# Patient Record
Sex: FEMALE | Race: BLACK OR AFRICAN AMERICAN
[De-identification: names, ages, dates, MRNs, and addresses within clinical notes are randomized per-mention and may not be internally consistent; named-entity substitution may affect disease eponyms.]

---

## 2018-05-09 ENCOUNTER — RESULT REVIEW (OUTPATIENT)
Age: 23
End: 2018-05-09

## 2019-03-08 ENCOUNTER — RESULT REVIEW (OUTPATIENT)
Age: 24
End: 2019-03-08

## 2019-08-11 ENCOUNTER — EMERGENCY (EMERGENCY)
Facility: HOSPITAL | Age: 24
LOS: 0 days | Discharge: HOME | End: 2019-08-12
Attending: EMERGENCY MEDICINE
Payer: OTHER MISCELLANEOUS

## 2019-08-11 VITALS
TEMPERATURE: 98 F | SYSTOLIC BLOOD PRESSURE: 109 MMHG | OXYGEN SATURATION: 100 % | RESPIRATION RATE: 18 BRPM | WEIGHT: 130.07 LBS | HEART RATE: 90 BPM | DIASTOLIC BLOOD PRESSURE: 68 MMHG | HEIGHT: 63 IN

## 2019-08-11 DIAGNOSIS — M79.672 PAIN IN LEFT FOOT: ICD-10-CM

## 2019-08-11 PROCEDURE — 99053 MED SERV 10PM-8AM 24 HR FAC: CPT

## 2019-08-11 PROCEDURE — 73630 X-RAY EXAM OF FOOT: CPT | Mod: LT

## 2019-08-11 PROCEDURE — 73610 X-RAY EXAM OF ANKLE: CPT | Mod: LT

## 2019-08-11 PROCEDURE — 99283 EMERGENCY DEPT VISIT LOW MDM: CPT

## 2019-08-11 PROCEDURE — 99284 EMERGENCY DEPT VISIT MOD MDM: CPT | Mod: 25

## 2019-08-12 VITALS
RESPIRATION RATE: 18 BRPM | SYSTOLIC BLOOD PRESSURE: 110 MMHG | TEMPERATURE: 98 F | OXYGEN SATURATION: 97 % | DIASTOLIC BLOOD PRESSURE: 65 MMHG | HEART RATE: 75 BPM

## 2019-08-12 PROCEDURE — 73610 X-RAY EXAM OF ANKLE: CPT | Mod: 26,LT

## 2019-08-12 PROCEDURE — 73630 X-RAY EXAM OF FOOT: CPT | Mod: 26,LT

## 2019-08-12 RX ORDER — IBUPROFEN 200 MG
600 TABLET ORAL ONCE
Refills: 0 | Status: COMPLETED | OUTPATIENT
Start: 2019-08-12 | End: 2019-08-12

## 2019-08-12 RX ORDER — IBUPROFEN 200 MG
1 TABLET ORAL
Qty: 40 | Refills: 0
Start: 2019-08-12 | End: 2019-08-21

## 2019-08-12 RX ORDER — CYCLOBENZAPRINE HYDROCHLORIDE 10 MG/1
1 TABLET, FILM COATED ORAL
Qty: 15 | Refills: 0
Start: 2019-08-12 | End: 2019-08-16

## 2019-08-12 RX ORDER — LIDOCAINE 4 G/100G
1 CREAM TOPICAL ONCE
Refills: 0 | Status: COMPLETED | OUTPATIENT
Start: 2019-08-12 | End: 2019-08-12

## 2019-08-12 RX ADMIN — LIDOCAINE 1 PATCH: 4 CREAM TOPICAL at 01:02

## 2019-08-12 RX ADMIN — Medication 600 MILLIGRAM(S): at 01:02

## 2019-08-12 NOTE — ED ADULT NURSE NOTE - NSIMPLEMENTINTERV_GEN_ALL_ED
Detail Level: Generalized
Implemented All Fall Risk Interventions:  [place] to call system. Call bell, personal items and telephone within reach. Instruct patient to call for assistance. Room bathroom lighting operational. Non-slip footwear when patient is off stretcher. Physically safe environment: no spills, clutter or unnecessary equipment. Stretcher in lowest position, wheels locked, appropriate side rails in place. Provide visual cue, wrist band, yellow gown, etc. Monitor gait and stability. Monitor for mental status changes and reorient to person, place, and time. Review medications for side effects contributing to fall risk. Reinforce activity limits and safety measures with patient and family.
Detail Level: Simple

## 2019-08-12 NOTE — ED ADULT NURSE NOTE - OBJECTIVE STATEMENT
Pt presented to ED c/o left foot and ankle pain since 20:00PM. Pt is a  at the [employer]; she picked up stuff from the floor and went up and down stairs before pain started. Denies falls, trauma. Pain worsens when foot is elevated, relieved by dangling. Came in a wheelchair from the waiting room, unable to bear weight. Ice pack applied.

## 2019-08-12 NOTE — ED PROVIDER NOTE - NSFOLLOWUPINSTRUCTIONS_ED_ALL_ED_FT
please follow up with your doctor in 2-3 days.   take medications as prescribed.    keep leg elevated.   may use ice or heating pads for comfort.   return to ED for any concerns

## 2021-05-07 PROBLEM — Z78.9 OTHER SPECIFIED HEALTH STATUS: Chronic | Status: ACTIVE | Noted: 2019-08-12

## 2021-06-16 ENCOUNTER — APPOINTMENT (OUTPATIENT)
Dept: ORTHOPEDIC SURGERY | Facility: HOSPITAL | Age: 26
End: 2021-06-16

## 2021-07-14 ENCOUNTER — OUTPATIENT (OUTPATIENT)
Dept: OUTPATIENT SERVICES | Facility: HOSPITAL | Age: 26
LOS: 1 days | End: 2021-07-14
Payer: SELF-PAY

## 2021-07-14 ENCOUNTER — APPOINTMENT (OUTPATIENT)
Dept: ORTHOPEDIC SURGERY | Facility: HOSPITAL | Age: 26
End: 2021-07-14

## 2021-07-14 VITALS
HEIGHT: 63.75 IN | SYSTOLIC BLOOD PRESSURE: 107 MMHG | BODY MASS INDEX: 22.99 KG/M2 | WEIGHT: 133 LBS | HEART RATE: 99 BPM | RESPIRATION RATE: 14 BRPM | DIASTOLIC BLOOD PRESSURE: 62 MMHG

## 2021-07-14 DIAGNOSIS — M25.50 PAIN IN UNSPECIFIED JOINT: ICD-10-CM

## 2021-07-14 DIAGNOSIS — M67.40 GANGLION, UNSPECIFIED SITE: ICD-10-CM

## 2021-07-14 PROCEDURE — G0463: CPT

## 2021-07-21 ENCOUNTER — OUTPATIENT (OUTPATIENT)
Dept: OUTPATIENT SERVICES | Facility: HOSPITAL | Age: 26
LOS: 1 days | End: 2021-07-21
Payer: SELF-PAY

## 2021-07-21 ENCOUNTER — APPOINTMENT (OUTPATIENT)
Dept: ORTHOPEDIC SURGERY | Facility: HOSPITAL | Age: 26
End: 2021-07-21

## 2021-07-21 VITALS
WEIGHT: 113 LBS | HEIGHT: 63.75 IN | DIASTOLIC BLOOD PRESSURE: 71 MMHG | HEART RATE: 89 BPM | TEMPERATURE: 97.5 F | BODY MASS INDEX: 19.53 KG/M2 | SYSTOLIC BLOOD PRESSURE: 118 MMHG | RESPIRATION RATE: 14 BRPM

## 2021-07-21 DIAGNOSIS — M67.40 GANGLION, UNSPECIFIED SITE: ICD-10-CM

## 2021-07-21 DIAGNOSIS — M79.643 PAIN IN UNSPECIFIED HAND: ICD-10-CM

## 2021-07-21 PROCEDURE — G0463: CPT

## 2022-02-22 NOTE — ED PROVIDER NOTE - TOBACCO USE
[Nl] : Respiratory [No Acute Changes] : No acute changes since previous visit Unknown if ever smoked

## 2023-09-22 ENCOUNTER — EMERGENCY (EMERGENCY)
Facility: HOSPITAL | Age: 28
LOS: 0 days | Discharge: HOME | End: 2023-09-22
Attending: STUDENT IN AN ORGANIZED HEALTH CARE EDUCATION/TRAINING PROGRAM
Payer: SELF-PAY

## 2023-09-22 VITALS
RESPIRATION RATE: 19 BRPM | WEIGHT: 130.07 LBS | TEMPERATURE: 98 F | HEART RATE: 75 BPM | SYSTOLIC BLOOD PRESSURE: 109 MMHG | DIASTOLIC BLOOD PRESSURE: 75 MMHG | HEIGHT: 62 IN | OXYGEN SATURATION: 100 %

## 2023-09-22 VITALS
HEART RATE: 63 BPM | SYSTOLIC BLOOD PRESSURE: 105 MMHG | TEMPERATURE: 98 F | OXYGEN SATURATION: 100 % | RESPIRATION RATE: 18 BRPM | DIASTOLIC BLOOD PRESSURE: 67 MMHG

## 2023-09-22 DIAGNOSIS — R07.89 OTHER CHEST PAIN: ICD-10-CM

## 2023-09-22 LAB
ALBUMIN SERPL ELPH-MCNC: 3.8 G/DL — SIGNIFICANT CHANGE UP (ref 3.3–5)
ALP SERPL-CCNC: 32 U/L — LOW (ref 40–120)
ALT FLD-CCNC: 20 U/L — SIGNIFICANT CHANGE UP (ref 12–78)
ANION GAP SERPL CALC-SCNC: 3 MMOL/L — LOW (ref 5–17)
AST SERPL-CCNC: 15 U/L — SIGNIFICANT CHANGE UP (ref 15–37)
BASOPHILS # BLD AUTO: 0.05 K/UL — SIGNIFICANT CHANGE UP (ref 0–0.2)
BASOPHILS NFR BLD AUTO: 0.8 % — SIGNIFICANT CHANGE UP (ref 0–2)
BILIRUB SERPL-MCNC: 0.6 MG/DL — SIGNIFICANT CHANGE UP (ref 0.2–1.2)
BUN SERPL-MCNC: 10 MG/DL — SIGNIFICANT CHANGE UP (ref 7–23)
CALCIUM SERPL-MCNC: 9.1 MG/DL — SIGNIFICANT CHANGE UP (ref 8.5–10.1)
CHLORIDE SERPL-SCNC: 107 MMOL/L — SIGNIFICANT CHANGE UP (ref 96–108)
CO2 SERPL-SCNC: 27 MMOL/L — SIGNIFICANT CHANGE UP (ref 22–31)
CREAT SERPL-MCNC: 0.8 MG/DL — SIGNIFICANT CHANGE UP (ref 0.5–1.3)
D DIMER BLD IA.RAPID-MCNC: 161 NG/ML DDU — SIGNIFICANT CHANGE UP
EGFR: 102 ML/MIN/1.73M2 — SIGNIFICANT CHANGE UP
EOSINOPHIL # BLD AUTO: 0.13 K/UL — SIGNIFICANT CHANGE UP (ref 0–0.5)
EOSINOPHIL NFR BLD AUTO: 2.2 % — SIGNIFICANT CHANGE UP (ref 0–6)
GLUCOSE SERPL-MCNC: 91 MG/DL — SIGNIFICANT CHANGE UP (ref 70–99)
HCG SERPL-ACNC: <1 MIU/ML — SIGNIFICANT CHANGE UP
HCT VFR BLD CALC: 38.8 % — SIGNIFICANT CHANGE UP (ref 34.5–45)
HGB BLD-MCNC: 12.5 G/DL — SIGNIFICANT CHANGE UP (ref 11.5–15.5)
IMM GRANULOCYTES NFR BLD AUTO: 0.2 % — SIGNIFICANT CHANGE UP (ref 0–0.9)
LYMPHOCYTES # BLD AUTO: 2.57 K/UL — SIGNIFICANT CHANGE UP (ref 1–3.3)
LYMPHOCYTES # BLD AUTO: 43.2 % — SIGNIFICANT CHANGE UP (ref 13–44)
MCHC RBC-ENTMCNC: 28.7 PG — SIGNIFICANT CHANGE UP (ref 27–34)
MCHC RBC-ENTMCNC: 32.2 GM/DL — SIGNIFICANT CHANGE UP (ref 32–36)
MCV RBC AUTO: 89.2 FL — SIGNIFICANT CHANGE UP (ref 80–100)
MONOCYTES # BLD AUTO: 0.29 K/UL — SIGNIFICANT CHANGE UP (ref 0–0.9)
MONOCYTES NFR BLD AUTO: 4.9 % — SIGNIFICANT CHANGE UP (ref 2–14)
NEUTROPHILS # BLD AUTO: 2.9 K/UL — SIGNIFICANT CHANGE UP (ref 1.8–7.4)
NEUTROPHILS NFR BLD AUTO: 48.7 % — SIGNIFICANT CHANGE UP (ref 43–77)
PLATELET # BLD AUTO: 213 K/UL — SIGNIFICANT CHANGE UP (ref 150–400)
POTASSIUM SERPL-MCNC: 4 MMOL/L — SIGNIFICANT CHANGE UP (ref 3.5–5.3)
POTASSIUM SERPL-SCNC: 4 MMOL/L — SIGNIFICANT CHANGE UP (ref 3.5–5.3)
PROT SERPL-MCNC: 7.8 GM/DL — SIGNIFICANT CHANGE UP (ref 6–8.3)
RBC # BLD: 4.35 M/UL — SIGNIFICANT CHANGE UP (ref 3.8–5.2)
RBC # FLD: 13.4 % — SIGNIFICANT CHANGE UP (ref 10.3–14.5)
SODIUM SERPL-SCNC: 137 MMOL/L — SIGNIFICANT CHANGE UP (ref 135–145)
TROPONIN I, HIGH SENSITIVITY RESULT: 3.12 NG/L — SIGNIFICANT CHANGE UP
WBC # BLD: 5.95 K/UL — SIGNIFICANT CHANGE UP (ref 3.8–10.5)
WBC # FLD AUTO: 5.95 K/UL — SIGNIFICANT CHANGE UP (ref 3.8–10.5)

## 2023-09-22 PROCEDURE — 84702 CHORIONIC GONADOTROPIN TEST: CPT

## 2023-09-22 PROCEDURE — 93005 ELECTROCARDIOGRAM TRACING: CPT

## 2023-09-22 PROCEDURE — 99285 EMERGENCY DEPT VISIT HI MDM: CPT | Mod: 25

## 2023-09-22 PROCEDURE — 85025 COMPLETE CBC W/AUTO DIFF WBC: CPT

## 2023-09-22 PROCEDURE — 36415 COLL VENOUS BLD VENIPUNCTURE: CPT

## 2023-09-22 PROCEDURE — 99285 EMERGENCY DEPT VISIT HI MDM: CPT

## 2023-09-22 PROCEDURE — 71046 X-RAY EXAM CHEST 2 VIEWS: CPT | Mod: 26

## 2023-09-22 PROCEDURE — 84484 ASSAY OF TROPONIN QUANT: CPT

## 2023-09-22 PROCEDURE — 93010 ELECTROCARDIOGRAM REPORT: CPT

## 2023-09-22 PROCEDURE — 71046 X-RAY EXAM CHEST 2 VIEWS: CPT

## 2023-09-22 PROCEDURE — 80053 COMPREHEN METABOLIC PANEL: CPT

## 2023-09-22 PROCEDURE — 85379 FIBRIN DEGRADATION QUANT: CPT

## 2023-09-22 NOTE — ED ADULT TRIAGE NOTE - CHIEF COMPLAINT QUOTE
Pt with c/o left sided chest discomfort with movement of left arm.  Denies pain radiating.  States she has been having the pain for last month.

## 2023-09-22 NOTE — ED STATDOCS - PROGRESS NOTE DETAILS
29 yo female with no significant PMH presents with midsternal/L sided chest pain x couple of months that worsened today. Pt states its worse with movement and movement of the L arm. Denies fever, cough, abd pain, n/v, sob, leg swelling. LMP was 1 week ago. Pt is on OC. Pt is worried because her grandmother had many heart attacks in the past.   Lungs CTA b/l. Heart NRR. EKG unremarkable.   Will check labs, CXR. Offered pain meds but pt declined at this time. -Stoney Clayton PA-C 29 yo female with no significant PMH presents with midsternal/L sided chest pain x couple of months that worsened today. Characterized as a sharp pain. Pt states its worse with movement and movement of the L arm. Denies fever, cough, abd pain, n/v, sob, leg swelling. LMP was 1 week ago. Pt is on OC. Pt is worried because her grandmother had many heart attacks in the past.   Lungs CTA b/l. Heart NRR. EKG unremarkable.   Will check labs, CXR. Offered pain meds but pt declined at this time. -Stoney Clayton PA-C CXR and labs unremarkable. DD and trop negative. Pt was made aware of the results. Will d/c home. -Stoney Clayton PA-C

## 2023-09-22 NOTE — ED STATDOCS - CLINICAL SUMMARY MEDICAL DECISION MAKING FREE TEXT BOX
pt with chest pain. no family hx of sudden death or cardiac disease at early age. vitally stable in ED. birth control use. will order labs and CXR. EKG with normal sinus, no ischemia.

## 2023-09-22 NOTE — ED STATDOCS - NS ED MD DISPO DISCHARGE
Please inform somewhat unusual to develop thrush after one course of antibiotics and would usually therefore recommend an evaluation but due to the current pandemic will agree to go ahead and treat in case indeed does have this issue.    5 mL 4 times per day for 10 days  Rx sent   Home

## 2023-09-22 NOTE — ED STATDOCS - NS ED ATTENDING STATEMENT MOD
This was a shared visit with the YOU. I reviewed and verified the documentation and independently performed the documented:

## 2023-09-22 NOTE — ED STATDOCS - PATIENT PORTAL LINK FT
You can access the FollowMyHealth Patient Portal offered by [facility] by registering at the following website: [email or web address]. By joining [employer]’s FollowMyHealth portal, you will also be able to view your health information using other applications (apps) compatible with our system.

## 2023-09-22 NOTE — ED STATDOCS - NSFOLLOWUPINSTRUCTIONS_ED_ALL_ED_FT
Nonspecific Chest Pain  Chest pain can be caused by many different conditions. Some causes of chest pain can be life-threatening. These will require treatment right away. Serious causes of chest pain include:  Heart attack.  A tear in the body's main blood vessel.  Redness and swelling (inflammation) around your heart.  Blood clot in your lungs.  Other causes of chest pain may not be so serious. These include:  Heartburn.  Anxiety or stress.  Damage to bones or muscles in your chest.  Lung infections.  Chest pain can feel like:  Pain or discomfort in your chest.  Crushing, pressure, aching, or squeezing pain.  Burning or tingling.  Dull or sharp pain that is worse when you move, cough, or take a deep breath.  Pain or discomfort that is also felt in your back, neck, jaw, shoulder, or arm, or pain that spreads to any of these areas.  It is hard to know whether your pain is caused by something that is serious or something that is not so serious. So it is important to see your doctor right away if you have chest pain.    Follow these instructions at home:  Medicines    Take over-the-counter and prescription medicines only as told by your doctor.  If you were prescribed an antibiotic medicine, take it as told by your doctor. Do not stop taking the antibiotic even if you start to feel better.  Lifestyle    A plate along with examples of foods in a healthy diet.  Rest as told by your doctor.  Do not use any products that contain nicotine or tobacco, such as cigarettes, e-cigarettes, and chewing tobacco. If you need help quitting, ask your doctor.  Do not drink alcohol.  Make lifestyle changes as told by your doctor. These may include:  Getting regular exercise. Ask your doctor what activities are safe for you.  Eating a heart-healthy diet. A diet and nutrition specialist (dietitian) can help you to learn healthy eating options.  Staying at a healthy weight.  Treating diabetes or high blood pressure, if needed.  Lowering your stress. Activities such as yoga and relaxation techniques can help.  General instructions    Pay attention to any changes in your symptoms. Tell your doctor about them or any new symptoms.  Avoid any activities that cause chest pain.  Keep all follow-up visits as told by your doctor. This is important. You may need more testing if your chest pain does not go away.  Contact a doctor if:  Your chest pain does not go away.  You feel depressed.  You have a fever.  Get help right away if:  Your chest pain is worse.  You have a cough that gets worse, or you cough up blood.  You have very bad (severe) pain in your belly (abdomen).  You pass out (faint).  You have either of these for no clear reason:  Sudden chest discomfort.  Sudden discomfort in your arms, back, neck, or jaw.  You have shortness of breath at any time.  You suddenly start to sweat, or your skin gets clammy.  You feel sick to your stomach (nauseous).  You throw up (vomit).  You suddenly feel lightheaded or dizzy.  You feel very weak or tired.  Your heart starts to beat fast, or it feels like it is skipping beats.  These symptoms may be an emergency. Do not wait to see if the symptoms will go away. Get medical help right away. Call your local emergency services (911 in the [place] Do not drive yourself to the hospital.    Summary  Chest pain can be caused by many different conditions. The cause may be serious and need treatment right away. If you have chest pain, see your doctor right away.  Follow your doctor's instructions for taking medicines and making lifestyle changes.  Keep all follow-up visits as told by your doctor. This includes visits for any further testing if your chest pain does not go away.  Be sure to know the signs that show that your condition has become worse. Get help right away if you have these symptoms.

## 2023-09-22 NOTE — ED STATDOCS - OBJECTIVE STATEMENT
29 y/o female with no pertinent PMHx presents to ED c/o non radiating midsternal sharp chest pain for the past couple of months, today more intense. States pain is worse with movement. Not associated with shortness of breath. Denies fever, coughs, nausea, or vomiting. On oral contraceptives. No recent traveling. No lower extremities swelling. No early age family hx of cardiac disease

## 2023-09-22 NOTE — ED STATDOCS - ATTENDING APP SHARED VISIT CONTRIBUTION OF CARE
I, Julius Castro MD,  performed the initial face to face bedside interview with this patient regarding history of present illness, review of symptoms and relevant past medical, social and family history.  I completed an independent physical examination.  I was the initial provider who evaluated this patient.   I personally saw the patient and performed a substantive portion of the visit including all aspects of the medical decision making.  I have signed out the follow up of any pending tests (i.e. labs, radiological studies) to the YOU.  I have communicated the patient’s plan of care and disposition with the YOU.  The history, relevant review of systems, past medical and surgical history, medical decision making, and physical examination was documented by the scribe in my presence and I attest to the accuracy of the documentation.

## 2024-08-16 ENCOUNTER — ASOB RESULT (OUTPATIENT)
Age: 29
End: 2024-08-16

## 2024-08-16 ENCOUNTER — APPOINTMENT (OUTPATIENT)
Dept: ANTEPARTUM | Facility: CLINIC | Age: 29
End: 2024-08-16
Payer: MEDICAID

## 2024-08-16 DIAGNOSIS — O35.9XX0 MATERNAL CARE FOR (SUSPECTED) FETAL ABNORMALITY AND DAMAGE, UNSPECIFIED, NOT APPLICABLE OR UNSPECIFIED: ICD-10-CM

## 2024-08-16 PROCEDURE — 76814 OB US NUCHAL MEAS ADD-ON: CPT

## 2024-08-16 PROCEDURE — 76813 OB US NUCHAL MEAS 1 GEST: CPT

## 2024-08-21 LAB
ADDITIONAL US: NORMAL
COMMENTS: AFP: NORMAL
CRL SCAN TWIN B: NORMAL
CRL SCAN: NORMAL
CROWN RUMP LENGTH TWIN B: 73.3 MM
CROWN RUMP LENGTH: 64.1 MM
DOWN SYNDROME AGE RISK: NORMAL
DOWN SYNDROME INTERPRETATION: NORMAL
DOWN SYNDROME SCREENING RISK: NORMAL
GEST. AGE ON COLLECTION DATE: 13.3 WEEKS
HCG MOM: 1.43
HCG VALUE: 117.1 IU/ML
MATERNAL AGE AT EDD: 31.1 YR
NOTE: AFP: NORMAL
NT MOM TWIN B: 0.93
NT TWIN B: 1.8 MM
NUCHAL TRANSLUCENCY (NT): 1.4 MM
NUCHAL TRANSLUCENCY MOM: 0.84
NUMBER OF FETUSES: 2
PAPP-A MOM: 4.51
PAPP-A VALUE: 6293.3 NG/ML
RACE: NORMAL
RESULTS AFP: NORMAL
SONOGRAPHER ID#: NORMAL
SUBMIT PART 2 SAMPLE USING: NORMAL
TEST RESULTS: AFP: NORMAL
TRISOMY 18 AGE RISK: NORMAL
TRISOMY 18 INTERPRETATION: NORMAL
TRISOMY 18 SCREENING RISK: NORMAL
WEIGHT AFP: 146 LBS

## 2024-09-13 ENCOUNTER — APPOINTMENT (OUTPATIENT)
Dept: ANTEPARTUM | Facility: CLINIC | Age: 29
End: 2024-09-13
Payer: MEDICAID

## 2024-09-13 ENCOUNTER — ASOB RESULT (OUTPATIENT)
Age: 29
End: 2024-09-13

## 2024-09-13 PROCEDURE — 76817 TRANSVAGINAL US OBSTETRIC: CPT

## 2024-09-13 PROCEDURE — 76815 OB US LIMITED FETUS(S): CPT

## 2024-09-13 PROCEDURE — 36415 COLL VENOUS BLD VENIPUNCTURE: CPT

## 2024-09-17 LAB
ADDITIONAL US: NORMAL
AFP MOM: 2.37
AFP VALUE: 97.5 NG/ML
COLLECTED ON 2: NORMAL
COLLECTED ON: NORMAL
CRL SCAN TWIN B: NORMAL
CRL SCAN: NORMAL
CROWN RUMP LENGTH TWIN B: 73.3 MM
CROWN RUMP LENGTH: 64.1 MM
DIA MOM: 1.91
DIA VALUE: 310.9 PG/ML
DOWN SYNDROME AGE RISK: NORMAL
DOWN SYNDROME INTERPRETATION: NORMAL
DOWN SYNDROME SCREENING RISK: NORMAL
FIRST TRIMESTER SAMPLE: NORMAL
GEST. AGE ON COLLECTION DATE: 13.3 WEEKS
GESTATIONAL AGE: 17.3 WEEKS
HCG MOM: 1.83
HCG VALUE: 52.2 IU/ML
INSULIN DEP DIABETES: NO
MATERNAL AGE AT EDD: 31.1 YR
NT MOM TWIN B: 0.93
NT TWIN B: 1.8 MM
NUCHAL TRANSLUCENCY (NT): 1.4 MM
NUCHAL TRANSLUCENCY MOM: 0.84
NUMBER OF FETUSES: 2
OPEN SPINA BIFIDA: NORMAL
OSB INTERPRETATION: NORMAL
PAPP-A MOM: 4.51
PAPP-A VALUE: 6293.3 NG/ML
RACE: NORMAL
SECOND TRIMESTER SAMPLE: NORMAL
SEQUENTIAL 2 COMMENTS: NORMAL
SEQUENTIAL 2 NOTE: NORMAL
SEQUENTIAL 2 RESULTS: NORMAL
SEQUENTIAL 2 TEST RESULTS: NORMAL
SONOGRAPHER ID#: NORMAL
TRISOMY 18 AGE RISK: NORMAL
TRISOMY 18 INTERPRETATION: NORMAL
TRISOMY 18 SCREENING RISK: NORMAL
UE3 MOM: 1.27
UE3 VALUE: 1.43 NG/ML
WEIGHT AFP: 146 LBS
WEIGHT: 147 LBS

## 2024-10-11 ENCOUNTER — APPOINTMENT (OUTPATIENT)
Dept: ANTEPARTUM | Facility: CLINIC | Age: 29
End: 2024-10-11

## 2024-10-11 ENCOUNTER — ASOB RESULT (OUTPATIENT)
Age: 29
End: 2024-10-11

## 2024-10-11 PROCEDURE — 76811 OB US DETAILED SNGL FETUS: CPT

## 2024-10-11 PROCEDURE — 76812 OB US DETAILED ADDL FETUS: CPT

## 2024-10-11 PROCEDURE — 76817 TRANSVAGINAL US OBSTETRIC: CPT | Mod: 59

## 2024-11-08 ENCOUNTER — ASOB RESULT (OUTPATIENT)
Age: 29
End: 2024-11-08

## 2024-11-08 ENCOUNTER — APPOINTMENT (OUTPATIENT)
Dept: ANTEPARTUM | Facility: CLINIC | Age: 29
End: 2024-11-08
Payer: MEDICAID

## 2024-11-08 PROCEDURE — 76816 OB US FOLLOW-UP PER FETUS: CPT | Mod: 59

## 2024-12-06 ENCOUNTER — ASOB RESULT (OUTPATIENT)
Age: 29
End: 2024-12-06

## 2024-12-06 ENCOUNTER — APPOINTMENT (OUTPATIENT)
Dept: ANTEPARTUM | Facility: CLINIC | Age: 29
End: 2024-12-06
Payer: MEDICAID

## 2024-12-06 PROCEDURE — 76820 UMBILICAL ARTERY ECHO: CPT | Mod: 59

## 2024-12-06 PROCEDURE — 76816 OB US FOLLOW-UP PER FETUS: CPT

## 2024-12-06 PROCEDURE — 76819 FETAL BIOPHYS PROFIL W/O NST: CPT | Mod: 59

## 2024-12-27 ENCOUNTER — ASOB RESULT (OUTPATIENT)
Age: 29
End: 2024-12-27

## 2024-12-27 ENCOUNTER — APPOINTMENT (OUTPATIENT)
Dept: ANTEPARTUM | Facility: CLINIC | Age: 29
End: 2024-12-27
Payer: MEDICAID

## 2024-12-27 PROCEDURE — 76820 UMBILICAL ARTERY ECHO: CPT | Mod: 59

## 2024-12-27 PROCEDURE — 76818 FETAL BIOPHYS PROFILE W/NST: CPT | Mod: 59

## 2024-12-27 PROCEDURE — 76816 OB US FOLLOW-UP PER FETUS: CPT

## 2024-12-27 PROCEDURE — 76816 OB US FOLLOW-UP PER FETUS: CPT | Mod: 59

## 2025-01-03 ENCOUNTER — APPOINTMENT (OUTPATIENT)
Dept: ANTEPARTUM | Facility: CLINIC | Age: 30
End: 2025-01-03

## 2025-01-03 ENCOUNTER — APPOINTMENT (OUTPATIENT)
Dept: ANTEPARTUM | Facility: CLINIC | Age: 30
End: 2025-01-03
Payer: MEDICAID

## 2025-01-03 ENCOUNTER — ASOB RESULT (OUTPATIENT)
Age: 30
End: 2025-01-03

## 2025-01-03 PROCEDURE — 76820 UMBILICAL ARTERY ECHO: CPT | Mod: 59

## 2025-01-03 PROCEDURE — 76821 MIDDLE CEREBRAL ARTERY ECHO: CPT | Mod: 59

## 2025-01-03 PROCEDURE — 76818 FETAL BIOPHYS PROFILE W/NST: CPT | Mod: 59

## 2025-01-10 ENCOUNTER — ASOB RESULT (OUTPATIENT)
Age: 30
End: 2025-01-10

## 2025-01-10 ENCOUNTER — APPOINTMENT (OUTPATIENT)
Dept: ANTEPARTUM | Facility: CLINIC | Age: 30
End: 2025-01-10
Payer: MEDICAID

## 2025-01-10 PROCEDURE — 76818 FETAL BIOPHYS PROFILE W/NST: CPT | Mod: 59

## 2025-01-10 PROCEDURE — 76820 UMBILICAL ARTERY ECHO: CPT

## 2025-01-10 PROCEDURE — 76821 MIDDLE CEREBRAL ARTERY ECHO: CPT

## 2025-01-17 ENCOUNTER — ASOB RESULT (OUTPATIENT)
Age: 30
End: 2025-01-17

## 2025-01-17 ENCOUNTER — APPOINTMENT (OUTPATIENT)
Dept: ANTEPARTUM | Facility: CLINIC | Age: 30
End: 2025-01-17
Payer: MEDICAID

## 2025-01-17 PROCEDURE — 76818 FETAL BIOPHYS PROFILE W/NST: CPT

## 2025-01-17 PROCEDURE — 76820 UMBILICAL ARTERY ECHO: CPT | Mod: 59

## 2025-01-17 PROCEDURE — 76816 OB US FOLLOW-UP PER FETUS: CPT | Mod: 59

## 2025-01-17 PROCEDURE — 76821 MIDDLE CEREBRAL ARTERY ECHO: CPT | Mod: 59

## 2025-01-17 PROCEDURE — 76816 OB US FOLLOW-UP PER FETUS: CPT

## 2025-01-21 ENCOUNTER — APPOINTMENT (OUTPATIENT)
Dept: ANTEPARTUM | Facility: CLINIC | Age: 30
End: 2025-01-21

## 2025-01-21 ENCOUNTER — APPOINTMENT (OUTPATIENT)
Dept: ANTEPARTUM | Facility: CLINIC | Age: 30
End: 2025-01-21
Payer: MEDICAID

## 2025-01-21 ENCOUNTER — ASOB RESULT (OUTPATIENT)
Age: 30
End: 2025-01-21

## 2025-01-21 PROCEDURE — 76820 UMBILICAL ARTERY ECHO: CPT | Mod: 59

## 2025-01-21 PROCEDURE — 76819 FETAL BIOPHYS PROFIL W/O NST: CPT

## 2025-01-21 PROCEDURE — 93976 VASCULAR STUDY: CPT

## 2025-01-24 ENCOUNTER — APPOINTMENT (OUTPATIENT)
Dept: ANTEPARTUM | Facility: CLINIC | Age: 30
End: 2025-01-24
Payer: MEDICAID

## 2025-01-24 ENCOUNTER — ASOB RESULT (OUTPATIENT)
Age: 30
End: 2025-01-24

## 2025-01-24 PROCEDURE — 76821 MIDDLE CEREBRAL ARTERY ECHO: CPT

## 2025-01-24 PROCEDURE — 76818 FETAL BIOPHYS PROFILE W/NST: CPT | Mod: 59

## 2025-01-24 PROCEDURE — 76818 FETAL BIOPHYS PROFILE W/NST: CPT

## 2025-01-24 PROCEDURE — 76820 UMBILICAL ARTERY ECHO: CPT

## 2025-01-24 PROCEDURE — 76820 UMBILICAL ARTERY ECHO: CPT | Mod: 59

## 2025-01-28 ENCOUNTER — ASOB RESULT (OUTPATIENT)
Age: 30
End: 2025-01-28

## 2025-01-28 ENCOUNTER — APPOINTMENT (OUTPATIENT)
Dept: ANTEPARTUM | Facility: CLINIC | Age: 30
End: 2025-01-28
Payer: MEDICAID

## 2025-01-28 PROCEDURE — 76820 UMBILICAL ARTERY ECHO: CPT

## 2025-01-28 PROCEDURE — 76818 FETAL BIOPHYS PROFILE W/NST: CPT | Mod: 59

## 2025-01-28 PROCEDURE — 76818 FETAL BIOPHYS PROFILE W/NST: CPT

## 2025-01-31 ENCOUNTER — APPOINTMENT (OUTPATIENT)
Dept: ANTEPARTUM | Facility: CLINIC | Age: 30
End: 2025-01-31

## 2025-01-31 ENCOUNTER — INPATIENT (INPATIENT)
Facility: HOSPITAL | Age: 30
LOS: 2 days | Discharge: HOME | DRG: 566 | End: 2025-02-03
Attending: OBSTETRICS & GYNECOLOGY | Admitting: OBSTETRICS & GYNECOLOGY
Payer: MEDICAID

## 2025-01-31 VITALS
SYSTOLIC BLOOD PRESSURE: 117 MMHG | HEIGHT: 63 IN | WEIGHT: 188.94 LBS | TEMPERATURE: 98 F | RESPIRATION RATE: 18 BRPM | HEART RATE: 82 BPM | DIASTOLIC BLOOD PRESSURE: 66 MMHG

## 2025-01-31 DIAGNOSIS — O36.5930 MATERNAL CARE FOR OTHER KNOWN OR SUSPECTED POOR FETAL GROWTH, THIRD TRIMESTER, NOT APPLICABLE OR UNSPECIFIED: ICD-10-CM

## 2025-01-31 LAB
BASOPHILS # BLD AUTO: 0 K/UL — SIGNIFICANT CHANGE UP (ref 0–0.2)
BASOPHILS NFR BLD AUTO: 0 % — SIGNIFICANT CHANGE UP (ref 0–2)
EOSINOPHIL # BLD AUTO: 0.21 K/UL — SIGNIFICANT CHANGE UP (ref 0–0.5)
EOSINOPHIL NFR BLD AUTO: 3 % — SIGNIFICANT CHANGE UP (ref 0–6)
HCT VFR BLD CALC: 28.8 % — LOW (ref 34.5–45)
HCT VFR BLD CALC: 32.9 % — LOW (ref 34.5–45)
HGB BLD-MCNC: 10.7 G/DL — LOW (ref 11.5–15.5)
HGB BLD-MCNC: 9.4 G/DL — LOW (ref 11.5–15.5)
LYMPHOCYTES # BLD AUTO: 1.55 K/UL — SIGNIFICANT CHANGE UP (ref 1–3.3)
LYMPHOCYTES # BLD AUTO: 22 % — SIGNIFICANT CHANGE UP (ref 13–44)
MCHC RBC-ENTMCNC: 30.4 PG — SIGNIFICANT CHANGE UP (ref 27–34)
MCHC RBC-ENTMCNC: 30.7 PG — SIGNIFICANT CHANGE UP (ref 27–34)
MCHC RBC-ENTMCNC: 32.5 G/DL — SIGNIFICANT CHANGE UP (ref 32–36)
MCHC RBC-ENTMCNC: 32.6 G/DL — SIGNIFICANT CHANGE UP (ref 32–36)
MCV RBC AUTO: 93.5 FL — SIGNIFICANT CHANGE UP (ref 80–100)
MCV RBC AUTO: 94.1 FL — SIGNIFICANT CHANGE UP (ref 80–100)
MONOCYTES # BLD AUTO: 0.56 K/UL — SIGNIFICANT CHANGE UP (ref 0–0.9)
MONOCYTES NFR BLD AUTO: 8 % — SIGNIFICANT CHANGE UP (ref 2–14)
NEUTROPHILS # BLD AUTO: 4.57 K/UL — SIGNIFICANT CHANGE UP (ref 1.8–7.4)
NEUTROPHILS NFR BLD AUTO: 65 % — SIGNIFICANT CHANGE UP (ref 43–77)
NRBC # BLD: SIGNIFICANT CHANGE UP /100 WBCS (ref 0–0)
NRBC BLD-RTO: SIGNIFICANT CHANGE UP /100 WBCS (ref 0–0)
PLATELET # BLD AUTO: 76 K/UL — LOW (ref 150–400)
PLATELET # BLD AUTO: 95 K/UL — LOW (ref 150–400)
RBC # BLD: 3.06 M/UL — LOW (ref 3.8–5.2)
RBC # BLD: 3.52 M/UL — LOW (ref 3.8–5.2)
RBC # FLD: 13.9 % — SIGNIFICANT CHANGE UP (ref 10.3–14.5)
RBC # FLD: 13.9 % — SIGNIFICANT CHANGE UP (ref 10.3–14.5)
T PALLIDUM AB TITR SER: NEGATIVE — SIGNIFICANT CHANGE UP
WBC # BLD: 6.81 K/UL — SIGNIFICANT CHANGE UP (ref 3.8–10.5)
WBC # BLD: 7.03 K/UL — SIGNIFICANT CHANGE UP (ref 3.8–10.5)
WBC # FLD AUTO: 6.81 K/UL — SIGNIFICANT CHANGE UP (ref 3.8–10.5)
WBC # FLD AUTO: 7.03 K/UL — SIGNIFICANT CHANGE UP (ref 3.8–10.5)

## 2025-01-31 PROCEDURE — 88307 TISSUE EXAM BY PATHOLOGIST: CPT

## 2025-01-31 PROCEDURE — 85025 COMPLETE CBC W/AUTO DIFF WBC: CPT

## 2025-01-31 PROCEDURE — 86900 BLOOD TYPING SEROLOGIC ABO: CPT

## 2025-01-31 PROCEDURE — 88307 TISSUE EXAM BY PATHOLOGIST: CPT | Mod: 26

## 2025-01-31 PROCEDURE — 36415 COLL VENOUS BLD VENIPUNCTURE: CPT

## 2025-01-31 PROCEDURE — 86780 TREPONEMA PALLIDUM: CPT

## 2025-01-31 PROCEDURE — 86850 RBC ANTIBODY SCREEN: CPT

## 2025-01-31 PROCEDURE — 94760 N-INVAS EAR/PLS OXIMETRY 1: CPT

## 2025-01-31 PROCEDURE — 59050 FETAL MONITOR W/REPORT: CPT

## 2025-01-31 PROCEDURE — 86901 BLOOD TYPING SEROLOGIC RH(D): CPT

## 2025-01-31 PROCEDURE — 85027 COMPLETE CBC AUTOMATED: CPT

## 2025-01-31 RX ORDER — OXYCODONE HYDROCHLORIDE 30 MG/1
10 TABLET ORAL
Refills: 0 | Status: DISCONTINUED | OUTPATIENT
Start: 2025-01-31 | End: 2025-02-03

## 2025-01-31 RX ORDER — IBUPROFEN 600 MG/1
600 TABLET, FILM COATED ORAL EVERY 6 HOURS
Refills: 0 | Status: COMPLETED | OUTPATIENT
Start: 2025-01-31 | End: 2025-12-30

## 2025-01-31 RX ORDER — ACETAMINOPHEN 160 MG/5ML
1000 SUSPENSION ORAL ONCE
Refills: 0 | Status: COMPLETED | OUTPATIENT
Start: 2025-01-31 | End: 2025-01-31

## 2025-01-31 RX ORDER — ONDANSETRON 4 MG/1
4 TABLET, ORALLY DISINTEGRATING ORAL EVERY 6 HOURS
Refills: 0 | Status: DISCONTINUED | OUTPATIENT
Start: 2025-01-31 | End: 2025-02-03

## 2025-01-31 RX ORDER — CEFAZOLIN SODIUM IN 0.9 % NACL 2 G/10 ML
2000 SYRINGE (ML) INTRAVENOUS ONCE
Refills: 0 | Status: COMPLETED | OUTPATIENT
Start: 2025-01-31 | End: 2025-01-31

## 2025-01-31 RX ORDER — MAGNESIUM HYDROXIDE 400 MG/5ML
30 SUSPENSION, ORAL (FINAL DOSE FORM) ORAL
Refills: 0 | Status: DISCONTINUED | OUTPATIENT
Start: 2025-01-31 | End: 2025-02-03

## 2025-01-31 RX ORDER — SODIUM CITRATE AND CITRIC ACID MONOHYDRATE 1002; 1500 MG/15ML; MG/15ML
30 SOLUTION ORAL ONCE
Refills: 0 | Status: COMPLETED | OUTPATIENT
Start: 2025-01-31 | End: 2025-01-31

## 2025-01-31 RX ORDER — SODIUM CHLORIDE 9 G/ML
1000 INJECTION, SOLUTION INTRAVENOUS
Refills: 0 | Status: DISCONTINUED | OUTPATIENT
Start: 2025-01-31 | End: 2025-01-31

## 2025-01-31 RX ORDER — ACETAMINOPHEN 160 MG/5ML
975 SUSPENSION ORAL
Refills: 0 | Status: DISCONTINUED | OUTPATIENT
Start: 2025-01-31 | End: 2025-02-03

## 2025-01-31 RX ORDER — TRANEXAMIC ACID 100 MG/ML
1000 INJECTION, SOLUTION INTRAVENOUS ONCE
Refills: 0 | Status: DISCONTINUED | OUTPATIENT
Start: 2025-01-31 | End: 2025-02-03

## 2025-01-31 RX ORDER — OXYTOCIN/0.9 % SODIUM CHLORIDE 10/500ML
42 PLASTIC BAG, INJECTION (ML) INTRAVENOUS
Qty: 30 | Refills: 0 | Status: DISCONTINUED | OUTPATIENT
Start: 2025-01-31 | End: 2025-02-03

## 2025-01-31 RX ORDER — FAMOTIDINE 10 MG/ML
20 INJECTION INTRAVENOUS ONCE
Refills: 0 | Status: COMPLETED | OUTPATIENT
Start: 2025-01-31 | End: 2025-01-31

## 2025-01-31 RX ORDER — NALOXONE HYDROCHLORIDE 3 MG/.1ML
0.1 SPRAY NASAL
Refills: 0 | Status: DISCONTINUED | OUTPATIENT
Start: 2025-01-31 | End: 2025-02-03

## 2025-01-31 RX ORDER — SODIUM CHLORIDE 9 G/ML
1000 INJECTION, SOLUTION INTRAVENOUS
Refills: 0 | Status: DISCONTINUED | OUTPATIENT
Start: 2025-01-31 | End: 2025-02-03

## 2025-01-31 RX ORDER — OXYTOCIN/0.9 % SODIUM CHLORIDE 10/500ML
167 PLASTIC BAG, INJECTION (ML) INTRAVENOUS
Qty: 30 | Refills: 0 | Status: DISCONTINUED | OUTPATIENT
Start: 2025-01-31 | End: 2025-02-03

## 2025-01-31 RX ORDER — CEFAZOLIN SODIUM IN 0.9 % NACL 2 G/10 ML
2000 SYRINGE (ML) INTRAVENOUS ONCE
Refills: 0 | Status: DISCONTINUED | OUTPATIENT
Start: 2025-01-31 | End: 2025-01-31

## 2025-01-31 RX ORDER — DIPHENHYDRAMINE HCL 25 MG
25 CAPSULE ORAL EVERY 6 HOURS
Refills: 0 | Status: COMPLETED | OUTPATIENT
Start: 2025-01-31 | End: 2025-12-30

## 2025-01-31 RX ORDER — OXYCODONE HYDROCHLORIDE 30 MG/1
5 TABLET ORAL ONCE
Refills: 0 | Status: DISCONTINUED | OUTPATIENT
Start: 2025-01-31 | End: 2025-02-03

## 2025-01-31 RX ORDER — OXYCODONE HYDROCHLORIDE 30 MG/1
5 TABLET ORAL
Refills: 0 | Status: DISCONTINUED | OUTPATIENT
Start: 2025-01-31 | End: 2025-02-03

## 2025-01-31 RX ORDER — ANTISEPTIC SURGICAL SCRUB 0.04 MG/ML
1 SOLUTION TOPICAL DAILY
Refills: 0 | Status: DISCONTINUED | OUTPATIENT
Start: 2025-01-31 | End: 2025-01-31

## 2025-01-31 RX ORDER — KETOROLAC TROMETHAMINE 10 MG
30 TABLET ORAL EVERY 6 HOURS
Refills: 0 | Status: DISCONTINUED | OUTPATIENT
Start: 2025-01-31 | End: 2025-02-01

## 2025-01-31 RX ORDER — DIPHENHYDRAMINE HCL 25 MG
25 CAPSULE ORAL EVERY 6 HOURS
Refills: 0 | Status: DISCONTINUED | OUTPATIENT
Start: 2025-01-31 | End: 2025-02-03

## 2025-01-31 RX ORDER — CLOSTRIDIUM TETANI TOXOID ANTIGEN (FORMALDEHYDE INACTIVATED), CORYNEBACTERIUM DIPHTHERIAE TOXOID ANTIGEN (FORMALDEHYDE INACTIVATED), BORDETELLA PERTUSSIS TOXOID ANTIGEN (GLUTARALDEHYDE INACTIVATED), BORDETELLA PERTUSSIS FILAMENTOUS HEMAGGLUTININ ANTIGEN (FORMALDEHYDE INACTIVATED), BORDETELLA PERTUSSIS PERTACTIN ANTIGEN, AND BORDETELLA PERTUSSIS FIMBRIAE 2/3 ANTIGEN 5; 2; 2.5; 5; 3; 5 [LF]/.5ML; [LF]/.5ML; UG/.5ML; UG/.5ML; UG/.5ML; UG/.5ML
0.5 INJECTION, SUSPENSION INTRAMUSCULAR ONCE
Refills: 0 | Status: DISCONTINUED | OUTPATIENT
Start: 2025-01-31 | End: 2025-02-03

## 2025-01-31 RX ORDER — MORPHINE SULFATE 60 MG/1
0.15 TABLET, FILM COATED, EXTENDED RELEASE ORAL ONCE
Refills: 0 | Status: DISCONTINUED | OUTPATIENT
Start: 2025-01-31 | End: 2025-02-03

## 2025-01-31 RX ORDER — HYDROMORPHONE HYDROCHLORIDE 4 MG/ML
1 INJECTION, SOLUTION INTRAMUSCULAR; INTRAVENOUS; SUBCUTANEOUS
Refills: 0 | Status: DISCONTINUED | OUTPATIENT
Start: 2025-01-31 | End: 2025-02-03

## 2025-01-31 RX ORDER — ENOXAPARIN SODIUM 100 MG/ML
40 INJECTION SUBCUTANEOUS EVERY 24 HOURS
Refills: 0 | Status: DISCONTINUED | OUTPATIENT
Start: 2025-01-31 | End: 2025-01-31

## 2025-01-31 RX ADMIN — ACETAMINOPHEN 400 MILLIGRAM(S): 160 SUSPENSION ORAL at 12:35

## 2025-01-31 RX ADMIN — SODIUM CITRATE AND CITRIC ACID MONOHYDRATE 30 MILLILITER(S): 1002; 1500 SOLUTION ORAL at 08:56

## 2025-01-31 RX ADMIN — FAMOTIDINE 20 MILLIGRAM(S): 10 INJECTION INTRAVENOUS at 08:57

## 2025-01-31 RX ADMIN — Medication 30 MILLIGRAM(S): at 18:00

## 2025-01-31 RX ADMIN — Medication 42 MILLIUNIT(S)/MIN: at 13:32

## 2025-01-31 RX ADMIN — ACETAMINOPHEN 975 MILLIGRAM(S): 160 SUSPENSION ORAL at 21:41

## 2025-01-31 RX ADMIN — Medication 2000 MILLIGRAM(S): at 11:50

## 2025-01-31 RX ADMIN — SODIUM CHLORIDE 125 MILLILITER(S): 9 INJECTION, SOLUTION INTRAVENOUS at 08:56

## 2025-01-31 RX ADMIN — Medication 30 MILLIGRAM(S): at 17:21

## 2025-01-31 RX ADMIN — SODIUM CHLORIDE 125 MILLILITER(S): 9 INJECTION, SOLUTION INTRAVENOUS at 21:45

## 2025-01-31 NOTE — OB PROVIDER H&P - NSHPPHYSICALEXAM_GEN_ALL_CORE
T(C): --  HR: --  BP: --  RR: --  SpO2: --    Gen: NAD, well-appearing, AAOx3   Abd: Soft, gravid  Ext: non-tender, non-edematous  SSE:   SVE:    Bedside sono:  FHT:  [place]: T(C): --  HR: --  BP: --  RR: --  SpO2: --    Gen: NAD, well-appearing, AAOx3   Abd: Soft, gravid  Ext: non-tender, non-edematous  SVE: pending   Bedside sono: pending   FHT: reactive x 2   [place]: no contractions noted

## 2025-01-31 NOTE — OB RN DELIVERY SUMMARY - NSSELHIDDEN_OBGYN_ALL_OB_FT
[NS_DeliveryAttending1_OBGYN_ALL_OB_FT:[email or web address]=],[NS_DeliveryRN_OBGYN_ALL_OB_FT:[id number]] [NS_DeliveryAttending1_OBGYN_ALL_OB_FT:[email or web address]=],[NS_DeliveryRN_OBGYN_ALL_OB_FT:[id number]=],[NS_DeliveryAssist2_OBGYN_ALL_OB_FT:[id number]=]

## 2025-01-31 NOTE — OB PROVIDER DELIVERY SUMMARY - BABY B: DATE/TIME, DELIVERY
Last seen by PCP: 1/21/22  Next appointment: 4/21/22  oxyCODONE-acetaminophen (PERCOCET) 5-325 MG per tablet- Last ordered: 1/21/22 #90 with 0 refills.     PDMP needs to be reviewed by Provider     Sent to provider to approve or deny     31-Jan-2025 12:00

## 2025-01-31 NOTE — OB RN PATIENT PROFILE - NS_PRENATALLABSOURCEGBSBACTPN_OBGYN_ALL_OB
Paul  1967 
[id number] Situation: 
Verbal report received from: [facility] Procedure: Procedure(s): 
COLONOSCOPY 
ENDOSCOPIC POLYPECTOMY Background: 
 
Preoperative diagnosis: SCREENING COLONOSCOPY (SCREENING FOR COLONIC NEOPLASIA) Postoperative diagnosis: polyp, hemorrhoids. :  Dr. Hector Knowles Assistant(s): Endoscopy Technician-1: Kaila Frank 
Endoscopy RN-1: Nargis Cuellar 
unknown

## 2025-01-31 NOTE — OB PROVIDER H&P - HISTORY OF PRESENT ILLNESS
30y  at 37 weeks GA by LMP who presents to L&D for induction of labor. Patient denies vaginal bleeding, contractions and leakage of fluid. She endorses good fetal movement. Denies fevers, chills, nausea, vomiting, chest pain, SOB, dizziness and headache. No other complaints at this time.   ELICEO: 25  LMP: 24  Prenatal course is significant for: requiring iron at one point, but was discontinued   Meds: PNVs  Allergies: NKDA; peanuts    EFW:          30y  at 37 weeks GA by LMP who presents to L&D for induction of labor. Patient denies vaginal bleeding, contractions and leakage of fluid. She endorses good fetal movement. Denies fevers, chills, nausea, vomiting, chest pain, SOB, dizziness and headache. No other complaints at this time.   ELICEO: 25  LMP: 24  Prenatal course is significant for: requiring iron at one point, but was discontinued   Meds: PNVs  Allergies: NKDA; peanuts    EFW:    Fetus A 1587    Fetus B 1525 4% growth discordance

## 2025-01-31 NOTE — OB PROVIDER H&P - NS_OBGYNHISTORY_OBGYN_ALL_OB_FT
POB: Patient delivered one daughter eight years ago  6lbs, 2 oz   PGYN: -fibroids, -ovarian cysts, denies STD hx, denies abnormal PAPs   PMH: Denies  PSH: Denies

## 2025-01-31 NOTE — OB PROVIDER DELIVERY SUMMARY - NSSELHIDDEN_OBGYN_ALL_OB_FT
[NS_DeliveryAttending1_OBGYN_ALL_OB_FT:[email or web address]=],[NS_DeliveryRN_OBGYN_ALL_OB_FT:[id number]=],[NS_DeliveryAssist2_OBGYN_ALL_OB_FT:[id number]=]

## 2025-01-31 NOTE — OB RN DELIVERY SUMMARY - NS_SEPSISRSKCALC_OBGYN_ALL_OB_FT
EOS calculated successfully. EOS Risk Factor: 0.5/1000 live births ([facility] national incidence); GA=37w;Temp=98.4; ROM=0; GBS='Positive'; Antibiotics='No antibiotics or any antibiotics < 2 hrs prior to birth'

## 2025-01-31 NOTE — OB PROVIDER H&P - CURRENT PREGNANCY COMPLICATIONS, OB PROFILE
Intrauterine Growth Restriction T[place]z Pregnancy And Lactation Text: The risk during pregnancy and breastfeeding is uncertain with this medication.

## 2025-01-31 NOTE — OB RN PATIENT PROFILE - FALL HARM RISK - UNIVERSAL INTERVENTIONS
Bed in lowest position, wheels locked, appropriate side rails in place/Call bell, personal items and telephone in reach/Instruct patient to call for assistance before getting out of bed or chair/Non-slip footwear when patient is out of bed/[place] to call system/Physically safe environment - no spills, clutter or unnecessary equipment/Purposeful Proactive Rounding/Room/bathroom lighting operational, light cord in reach

## 2025-01-31 NOTE — OB NEONATOLOGY/PEDIATRICIAN DELIVERY SUMMARY - NSPEDSNEONOTESB_OBGYN_ALL_OB_FT
Gabriel called to C/S delivery at 37-0/7 weeks for vertex/breech twins.  Mother is a 31 yo  A+/RI/HBsAg-/RPR-HCV-/HIV-/GBS+ woman who presented for IOL at term prior to the onset of labor. Decision made to deliver by  due to malpresentation of twin B.    Baby B emerged breech with spontaneous cry.  Delayed cord clamping x 30 seconds performed.  Baby warmed/dried/stimmed.  BW > 2kg.  No sepsis risk factors.   is stable for couplet care by the level I NBN team.

## 2025-01-31 NOTE — OB PROVIDER H&P - ATTENDING COMMENTS
Patient discussed with family medicine resident.   Presents for scheduled IOL at 37wks - twin IUP with IUGR of twin B   Fetal growth discordance 4% as of 12/27/2024   Patient without obstetrical complaints. + FM x 2   Fetal tracing reactive   Consents obtained for induction, but discussed possibility of abdominal delivery due to fetal malpresentation. Patient expressed understanding but desired trial of labor     SVE and sono not performed on my shift - patient turned over to day team for continued management and initiation of induction if indicated.     PHANI Rangel

## 2025-01-31 NOTE — OB RN PATIENT PROFILE - BP NONINVASIVE DIASTOLIC (MM HG)
Nathaniel Allen from [facility] called asking for surgical path report from patient's recent surgery on 10/18 with Dr Daryon Daily  Report faxed to [phone] as requested 
66

## 2025-01-31 NOTE — OB NEONATOLOGY/PEDIATRICIAN DELIVERY SUMMARY - NSPEDSNEONOTESA_OBGYN_ALL_OB_FT
Gabriel called to C/S delivery at 37-0/7 weeks for vertex/breech twins.  Mother is a 29 yo  A+/RI/HBsAg-/RPR-HCV-/HIV-/GBS+ woman who presented for IOL at term prior to the onset of labor. Decision made to deliver by  due to malpresentation of twin B.    Baby A emerged vertex with spontaneous cry.  Delayed cord clamping x 30 seconds performed.  Baby warmed/dried/stimmed.  BW > 2.5kg.  No sepsis risk factors.   is stable for couplet care by the level I NBN team.

## 2025-01-31 NOTE — OB PROVIDER DELIVERY SUMMARY - NSPROVIDERDELIVERYNOTE_OBGYN_ALL_OB_FT
uncomplicated primary  section performed due to breech presentation of baby B and IUGR. Viable male delivered in cephalic presentation atraumatically and female delivered in breech presentation atraumatically. Normal uterus, tubes and ovaries noted bilaterally. Excellent hemostasis observed at end of case, EBL 800cc. See separate dictation, Dr. Curtis present for entirety of case. uncomplicated primary  section performed due to breech presentation of baby B and IUGR. Viable male delivered in cephalic presentation atraumatically and female delivered in breech presentation atraumatically. Normal uterus, tubes and ovaries noted bilaterally. Excellent hemostasis observed at end of case, EBL 950cc. See separate dictation, Dr. Curtis present for entirety of case.    Dictation #: [id number]

## 2025-01-31 NOTE — OB PROVIDER H&P - ASSESSMENT
A/P:   -Admit to L&D  -Consent  -Admission labs  -NPO, except ice chips   -IV fluids  -Labor: Intact. Latent labor.   -Fetus: Cat I tracing. Continuous toco and fetal monitoring.   -GBS: ???, no GBS ppx required ***  -Analgesia:     Discussed with Dr. Rangel    A/P:   -Admit to L&D  -Consent  -Admission labs  -NPO, except ice chips   -IV fluids  -Labor: Intact. Latent labor.   -Fetus: Cat I tracing. Continuous toco and fetal monitoring.   -GBS: Positive, GBS prophylaxis required   -Analgesia:     Discussed with Dr. Rangel    A/P:   -Admit to L&D  -Consent  -Admission labs  -NPO, except ice chips   -IV fluids  -Fetusus: reactive. Continuous toco and fetal monitoring.   -GBS: Positive, GBS prophylaxis required   -Analgesia: as per patient     Discussed with Dr. Rangel

## 2025-01-31 NOTE — OB RN INTRAOPERATIVE NOTE - NSSELHIDDEN_OBGYN_ALL_OB_FT
[NS_DeliveryAttending1_OBGYN_ALL_OB_FT:[email or web address]=],[NS_DeliveryRN_OBGYN_ALL_OB_FT:[id number]=],[NS_DeliveryAssist2_OBGYN_ALL_OB_FT:[id number]=]

## 2025-02-01 LAB
BASOPHILS # BLD AUTO: 0.03 K/UL — SIGNIFICANT CHANGE UP (ref 0–0.2)
BASOPHILS NFR BLD AUTO: 0.3 % — SIGNIFICANT CHANGE UP (ref 0–2)
EOSINOPHIL # BLD AUTO: 0.07 K/UL — SIGNIFICANT CHANGE UP (ref 0–0.5)
EOSINOPHIL NFR BLD AUTO: 0.6 % — SIGNIFICANT CHANGE UP (ref 0–6)
HCT VFR BLD CALC: 26.9 % — LOW (ref 34.5–45)
HGB BLD-MCNC: 8.7 G/DL — LOW (ref 11.5–15.5)
IMM GRANULOCYTES NFR BLD AUTO: 0.7 % — SIGNIFICANT CHANGE UP (ref 0–0.9)
LYMPHOCYTES # BLD AUTO: 19.8 % — SIGNIFICANT CHANGE UP (ref 13–44)
LYMPHOCYTES # BLD AUTO: 2.33 K/UL — SIGNIFICANT CHANGE UP (ref 1–3.3)
MCHC RBC-ENTMCNC: 30.2 PG — SIGNIFICANT CHANGE UP (ref 27–34)
MCHC RBC-ENTMCNC: 32.3 G/DL — SIGNIFICANT CHANGE UP (ref 32–36)
MCV RBC AUTO: 93.4 FL — SIGNIFICANT CHANGE UP (ref 80–100)
MONOCYTES # BLD AUTO: 1.01 K/UL — HIGH (ref 0–0.9)
MONOCYTES NFR BLD AUTO: 8.6 % — SIGNIFICANT CHANGE UP (ref 2–14)
NEUTROPHILS # BLD AUTO: 8.25 K/UL — HIGH (ref 1.8–7.4)
NEUTROPHILS NFR BLD AUTO: 70 % — SIGNIFICANT CHANGE UP (ref 43–77)
PLATELET # BLD AUTO: 90 K/UL — LOW (ref 150–400)
RBC # BLD: 2.88 M/UL — LOW (ref 3.8–5.2)
RBC # FLD: 13.6 % — SIGNIFICANT CHANGE UP (ref 10.3–14.5)
WBC # BLD: 11.77 K/UL — HIGH (ref 3.8–10.5)
WBC # FLD AUTO: 11.77 K/UL — HIGH (ref 3.8–10.5)

## 2025-02-01 RX ORDER — IBUPROFEN 600 MG/1
1 TABLET, FILM COATED ORAL
Qty: 0 | Refills: 0 | DISCHARGE
Start: 2025-02-01

## 2025-02-01 RX ORDER — IBUPROFEN 600 MG/1
600 TABLET, FILM COATED ORAL EVERY 6 HOURS
Refills: 0 | Status: DISCONTINUED | OUTPATIENT
Start: 2025-02-01 | End: 2025-02-03

## 2025-02-01 RX ORDER — ACETAMINOPHEN 160 MG/5ML
3 SUSPENSION ORAL
Qty: 0 | Refills: 0 | DISCHARGE
Start: 2025-02-01

## 2025-02-01 RX ORDER — ENOXAPARIN SODIUM 100 MG/ML
40 INJECTION SUBCUTANEOUS EVERY 24 HOURS
Refills: 0 | Status: DISCONTINUED | OUTPATIENT
Start: 2025-02-01 | End: 2025-02-03

## 2025-02-01 RX ADMIN — ACETAMINOPHEN 975 MILLIGRAM(S): 160 SUSPENSION ORAL at 09:00

## 2025-02-01 RX ADMIN — ACETAMINOPHEN 975 MILLIGRAM(S): 160 SUSPENSION ORAL at 08:15

## 2025-02-01 RX ADMIN — Medication 80 MILLIGRAM(S): at 18:17

## 2025-02-01 RX ADMIN — Medication 80 MILLIGRAM(S): at 11:38

## 2025-02-01 RX ADMIN — IBUPROFEN 600 MILLIGRAM(S): 600 TABLET, FILM COATED ORAL at 18:16

## 2025-02-01 RX ADMIN — Medication 30 MILLILITER(S): at 11:38

## 2025-02-01 RX ADMIN — Medication 30 MILLIGRAM(S): at 08:15

## 2025-02-01 RX ADMIN — Medication 30 MILLIGRAM(S): at 00:32

## 2025-02-01 RX ADMIN — Medication 30 MILLIGRAM(S): at 13:24

## 2025-02-01 RX ADMIN — ACETAMINOPHEN 975 MILLIGRAM(S): 160 SUSPENSION ORAL at 09:30

## 2025-02-01 RX ADMIN — Medication 30 MILLIGRAM(S): at 11:38

## 2025-02-01 RX ADMIN — Medication 30 MILLIGRAM(S): at 07:03

## 2025-02-01 RX ADMIN — ACETAMINOPHEN 975 MILLIGRAM(S): 160 SUSPENSION ORAL at 16:03

## 2025-02-01 RX ADMIN — ACETAMINOPHEN 975 MILLIGRAM(S): 160 SUSPENSION ORAL at 03:17

## 2025-02-01 RX ADMIN — ACETAMINOPHEN 975 MILLIGRAM(S): 160 SUSPENSION ORAL at 22:03

## 2025-02-01 RX ADMIN — ENOXAPARIN SODIUM 40 MILLIGRAM(S): 100 INJECTION SUBCUTANEOUS at 11:39

## 2025-02-01 NOTE — PROGRESS NOTE ADULT - SUBJECTIVE AND OBJECTIVE BOX
MANJINDER GUTIERREZ is a 30y GP now POD#1 s/p  section of di/di twins at 37 weeks gestation, uncomplicated.     S:    The patient has no complaints.   Pain is controlled with current treatment regimen.   She is ambulating without difficulty and tolerating PO.   + flatus/-BM/+ voiding   She endorses appropriate lochia, which is decreasing.   She is breastfeeding without difficulty.     O:    T(C): 37.1 (25 @ 05:13), Max: 37.1 (25 @ 05:13)  HR: 75 (25 @ 05:13) (54 - 75)  BP: 100/59 (25 @ 05:13) (100/59 - 114/68)  RR: 16 (25 @ 05:13) (16 - 18)  SpO2: 99% (25 @ 05:13) (99% - 100%)  Wt(kg): --    Gen: NAD, AOx3  CV: Regualr rate and rhythm  Pulm: CTA bilaterally   Abdomen:  Soft, non-tender, +mildly enlarged, +bowel sounds.  Uterus:  Fundus firm below umbilicus  Incision:  Clean/dry/intact with steri-strips/staples in place  VE:  +Lochia  Ext:  Non-tender, non-edematous                           9.4    6.81  )-----------( 76       ( 2025 10:00 )             28.8

## 2025-02-01 NOTE — DISCHARGE NOTE OB - CARE PLAN
Principal Discharge DX:	Twin delivery by   Assessment and plan of treatment:	Patient had  section.   1) Please take Ibuprofen and Tylenol as needed for pain control  2) Nothing in the vagina for 6 weeks (including no sex, no tampons, and no douching).  3) Please call your doctor for a follow up your postpartum appointment in 1-2 weeks for incision check, then another appointment 4 weeks after that.  4) Please continue taking vitamins postpartum.   5) Please call the office sooner if you have heavy vaginal bleeding, severe abdominal pain, or fever > 100.4F.  6) You may resume regular activity as tolerated   1

## 2025-02-01 NOTE — DISCHARGE NOTE OB - BREASTFEEDING PROVIDES MATERNAL HEALTH BENEFITS, DECREASED PREMENOPAUSAL BREAST CANCER, OVARIAN CANCER AND TYPE II DIABETES MELLITUS
Telephone Encounter by Renetta Rivera at 01/17/18 12:34 PM     Author:  Renetta Rivera Service:  (none) Author Type:       Filed:  01/17/18 12:35 PM Encounter Date:  1/17/2018 Status:  Signed     :  Renetta Rivera ()            Pt returning a phone call please call back when available.[AA1.1M]      Revision History        User Key Date/Time User Provider Type Action    > AA1.1 01/17/18 12:35 PM Renetta Rivera  Sign    M - Manual             Statement Selected

## 2025-02-01 NOTE — DISCHARGE NOTE OB - MATERIALS PROVIDED
Vaccinations/[facility]  Screening Program/  Immunization Record/Breastfeeding Log/Bottle Feeding Log/Breastfeeding Mother’s Support Group Information/Guide to Postpartum Care/[facility] Hearing Screen Program/Back To Sleep Handout/Shaken Baby Prevention Handout/Breastfeeding Guide and Packet/Birth Certificate Instructions

## 2025-02-01 NOTE — DISCHARGE NOTE OB - PATIENT PORTAL LINK FT
You can access the FollowMyHealth Patient Portal offered by [facility] by registering at the following website: [email or web address]. By joining [employer]’s FollowMyHealth portal, you will also be able to view your health information using other applications (apps) compatible with our system.

## 2025-02-01 NOTE — DISCHARGE NOTE OB - CARE PROVIDER_API CALL
Renee Stephen  Obstetrics and Gynecology  [address] [place] [address]  Phone: [phone]  Fax: [phone]  Established Patient  Follow Up Time:

## 2025-02-01 NOTE — PROGRESS NOTE ADULT - ATTENDING COMMENTS
MD1 Attending On call Note    Patient seen and examined. Agree with above.  Platelet count josué to 90 this AM- ok to start Lovenox for DVT prophylaxis  Routine PP care

## 2025-02-01 NOTE — DISCHARGE NOTE OB - FINANCIAL ASSISTANCE
[facility] provides services at a reduced cost to those who are determined to be eligible through [facility]’s financial assistance program. Information regarding [facility]’s financial assistance program can be found by going to [email or web address] or by calling [phone].

## 2025-02-02 RX ORDER — FERROUS SULFATE 325(65) MG
325 TABLET ORAL DAILY
Refills: 0 | Status: DISCONTINUED | OUTPATIENT
Start: 2025-02-02 | End: 2025-02-03

## 2025-02-02 RX ADMIN — Medication 80 MILLIGRAM(S): at 10:36

## 2025-02-02 RX ADMIN — IBUPROFEN 600 MILLIGRAM(S): 600 TABLET, FILM COATED ORAL at 13:32

## 2025-02-02 RX ADMIN — ACETAMINOPHEN 975 MILLIGRAM(S): 160 SUSPENSION ORAL at 09:17

## 2025-02-02 RX ADMIN — IBUPROFEN 600 MILLIGRAM(S): 600 TABLET, FILM COATED ORAL at 01:00

## 2025-02-02 RX ADMIN — Medication 325 MILLIGRAM(S): at 10:36

## 2025-02-02 RX ADMIN — IBUPROFEN 600 MILLIGRAM(S): 600 TABLET, FILM COATED ORAL at 21:26

## 2025-02-02 RX ADMIN — OXYCODONE HYDROCHLORIDE 5 MILLIGRAM(S): 30 TABLET ORAL at 10:36

## 2025-02-02 RX ADMIN — IBUPROFEN 600 MILLIGRAM(S): 600 TABLET, FILM COATED ORAL at 11:55

## 2025-02-02 RX ADMIN — ACETAMINOPHEN 975 MILLIGRAM(S): 160 SUSPENSION ORAL at 03:51

## 2025-02-02 RX ADMIN — ACETAMINOPHEN 975 MILLIGRAM(S): 160 SUSPENSION ORAL at 21:25

## 2025-02-02 RX ADMIN — OXYCODONE HYDROCHLORIDE 5 MILLIGRAM(S): 30 TABLET ORAL at 22:06

## 2025-02-02 RX ADMIN — ACETAMINOPHEN 975 MILLIGRAM(S): 160 SUSPENSION ORAL at 03:58

## 2025-02-02 RX ADMIN — Medication 30 MILLILITER(S): at 10:37

## 2025-02-02 RX ADMIN — ACETAMINOPHEN 975 MILLIGRAM(S): 160 SUSPENSION ORAL at 21:05

## 2025-02-02 RX ADMIN — OXYCODONE HYDROCHLORIDE 5 MILLIGRAM(S): 30 TABLET ORAL at 21:06

## 2025-02-02 RX ADMIN — ACETAMINOPHEN 975 MILLIGRAM(S): 160 SUSPENSION ORAL at 15:24

## 2025-02-02 RX ADMIN — IBUPROFEN 600 MILLIGRAM(S): 600 TABLET, FILM COATED ORAL at 06:28

## 2025-02-02 RX ADMIN — ENOXAPARIN SODIUM 40 MILLIGRAM(S): 100 INJECTION SUBCUTANEOUS at 11:55

## 2025-02-02 RX ADMIN — IBUPROFEN 600 MILLIGRAM(S): 600 TABLET, FILM COATED ORAL at 18:24

## 2025-02-02 RX ADMIN — OXYCODONE HYDROCHLORIDE 5 MILLIGRAM(S): 30 TABLET ORAL at 11:01

## 2025-02-02 RX ADMIN — IBUPROFEN 600 MILLIGRAM(S): 600 TABLET, FILM COATED ORAL at 00:01

## 2025-02-02 RX ADMIN — ACETAMINOPHEN 975 MILLIGRAM(S): 160 SUSPENSION ORAL at 15:55

## 2025-02-02 RX ADMIN — Medication 80 MILLIGRAM(S): at 18:29

## 2025-02-02 RX ADMIN — IBUPROFEN 600 MILLIGRAM(S): 600 TABLET, FILM COATED ORAL at 18:55

## 2025-02-02 RX ADMIN — ACETAMINOPHEN 975 MILLIGRAM(S): 160 SUSPENSION ORAL at 11:01

## 2025-02-02 NOTE — PROGRESS NOTE ADULT - ATTENDING COMMENTS
patient voiding and ambulating with no issues, incision C/D/I, thrombocytopenia improving, VSS, likely discharge home tomorrow, likely circumcision tomorrow patient voiding and ambulating with no issues, incision C/D/I, thrombocytopenia improving, VSS, likely discharge home tomorrow, requesting circumcision

## 2025-02-02 NOTE — PROGRESS NOTE ADULT - SUBJECTIVE AND OBJECTIVE BOX
MANJINDER GUTIERREZ is a 30y  now POD#2 s/p primary  section at 37 weeks 0 days gestation, di/di twins (IUGR), thrombocytopenia 94>76>90.    S:    No acute events overnight.   The patient has no complaints.  Pain controlled with current treatment regimen.   She is ambulating without difficulty and tolerating PO.   + flatus/-BM/+ voiding   She endorses appropriate lochia, which is decreasing.   She is breastfeeding without difficulty. She denies feeling engorged.   She denies fevers, chills, nausea and vomiting.   She denies lightheadedness, dizziness, palpitations, chest pain and SOB.     O:    T(C): 36.3 (25 @ 20:25), Max: 37 (25 @ 08:53)  HR: 84 (25 @ 20:25) (80 - 84)  BP: 100/64 (25 @ 20:25) (93/53 - 100/64)  RR: 16 (25 @ 20:25) (16 - 16)  SpO2: 98% (25 @ 20:25) (98% - 100%)    Gen: NAD, AOx3  CV: well perfused  Pulm: normal respiratory effort   Abdomen:  Soft, non-tender, non-distended  Incision: Clean/dry/intact with steri-strips in place   Uterus:  Fundus firm below umbilicus  VE:  Expected lochia  Ext:  Bilateral lower extremities non-tender and non-edematous                          8.7    11.77 )-----------( 90       ( 2025 07:08 )             26.9             A/P:    -Vital signs stable  -Hgb: 9.4>8.7 -> AM labs pending   -Voiding, tolerating PO, bowel function nml   -Advance care as tolerated   -Continue routine postpartum and postoperative care and education  -Healthy male infant, desires circumcision  -Healthy female infant  -DVT ppx: Lovenox ordered/ Ambulation encouraged. SCDs while in bed.   -Dispo: Anticipate discharge to home tomorrow pending attending approval.  -Dispo: Patient to be discharged when meeting all postpartum and postoperative milestones and pending attending approval. MANJINDER GUTIERREZ is a 30y  now POD#2 s/p primary  section at 37 weeks 0 days gestation, di/di twins (IUGR), thrombocytopenia    S:    No acute events overnight.   The patient has no complaints.  Pain controlled with current treatment regimen.   She is ambulating without difficulty and tolerating PO.   + flatus/-BM/+ voiding   She endorses appropriate lochia, which is decreasing.   She is breastfeeding without difficulty. She denies feeling engorged.   She denies fevers, chills, nausea and vomiting.   She denies lightheadedness, dizziness, palpitations, chest pain and SOB.     O:    T(C): 36.3 (25 @ 20:25), Max: 37 (25 @ 08:53)  HR: 84 (25 @ 20:25) (80 - 84)  BP: 100/64 (25 @ 20:25) (93/53 - 100/64)  RR: 16 (25 @ 20:25) (16 - 16)  SpO2: 98% (25 @ 20:25) (98% - 100%)    Gen: NAD, AOx3  CV: well perfused  Pulm: normal respiratory effort   Abdomen:  Soft, non-tender, non-distended  Incision: Clean/dry/intact with steri-strips in place   Uterus:  Fundus firm below umbilicus  VE:  Expected lochia  Ext:  Bilateral lower extremities non-tender and non-edematous                          8.7    11.77 )-----------( 90       ( 2025 07:08 )             26.9

## 2025-02-02 NOTE — PROGRESS NOTE ADULT - ASSESSMENT
A/P:    -Vital Signs Stable  -Voiding, tolerating PO, bowel function nml   -Heme: Hgb 9.4 --> AM labs pending   -Advance care as tolerated   -Continue routine postpartum/postoperative care and education  -Discussed with patient regarding contraceptive on d/c patient prefers IUD
A/P:  MANJINDER GUTIERREZ is a 30y  now POD#2 s/p primary  section at 37 weeks 0 days gestation, di/di twins (IUGR), thrombocytopenia    # thrombocytopenia  - plt 95 > 76 > 90    # routine postpartum  -Vital signs stable  -Hgb: 10.7>9.4>8.7, PO iron  -Voiding, tolerating PO, bowel function nml   -Advance care as tolerated   -Continue routine postpartum and postoperative care and education  -Healthy male infant, desires circumcision  -Healthy female infant  -DVT ppx: Lovenox ordered/ Ambulation encouraged. SCDs while in bed.   -Dispo: Anticipate discharge to home today

## 2025-02-03 VITALS
DIASTOLIC BLOOD PRESSURE: 52 MMHG | SYSTOLIC BLOOD PRESSURE: 109 MMHG | HEART RATE: 87 BPM | TEMPERATURE: 98 F | RESPIRATION RATE: 16 BRPM | OXYGEN SATURATION: 100 %

## 2025-02-03 RX ADMIN — IBUPROFEN 600 MILLIGRAM(S): 600 TABLET, FILM COATED ORAL at 00:36

## 2025-02-03 RX ADMIN — IBUPROFEN 600 MILLIGRAM(S): 600 TABLET, FILM COATED ORAL at 12:58

## 2025-02-03 RX ADMIN — ENOXAPARIN SODIUM 40 MILLIGRAM(S): 100 INJECTION SUBCUTANEOUS at 12:58

## 2025-02-03 RX ADMIN — ACETAMINOPHEN 975 MILLIGRAM(S): 160 SUSPENSION ORAL at 08:45

## 2025-02-03 RX ADMIN — Medication 80 MILLIGRAM(S): at 05:59

## 2025-02-03 RX ADMIN — ACETAMINOPHEN 975 MILLIGRAM(S): 160 SUSPENSION ORAL at 03:42

## 2025-02-03 RX ADMIN — IBUPROFEN 600 MILLIGRAM(S): 600 TABLET, FILM COATED ORAL at 02:00

## 2025-02-03 RX ADMIN — ACETAMINOPHEN 975 MILLIGRAM(S): 160 SUSPENSION ORAL at 03:12

## 2025-02-03 RX ADMIN — ACETAMINOPHEN 975 MILLIGRAM(S): 160 SUSPENSION ORAL at 09:15

## 2025-02-03 RX ADMIN — IBUPROFEN 600 MILLIGRAM(S): 600 TABLET, FILM COATED ORAL at 13:30

## 2025-02-03 RX ADMIN — Medication 325 MILLIGRAM(S): at 12:58

## 2025-02-03 RX ADMIN — Medication 80 MILLIGRAM(S): at 12:58

## 2025-02-03 RX ADMIN — IBUPROFEN 600 MILLIGRAM(S): 600 TABLET, FILM COATED ORAL at 05:59

## 2025-02-03 NOTE — PROGRESS NOTE ADULT - SUBJECTIVE AND OBJECTIVE BOX
MANJINDER GUTIERREZ is a 30y  now POD#3  section at [facility] @37w0d di/di twins (IUGR), uncomplicated. Pt thrombocytopenic, however remained stable throughout course; no sign of active bleeding.      S:    No acute events overnight.   The patient has no complaints.  Pain controlled with current treatment regimen.   She is ambulating without difficulty and tolerating PO.   + flatus/-BM/+ voiding   She endorses appropriate lochia, which is decreasing.   She is breastfeeding without difficulty.   She denies fevers, chills, nausea and vomiting.   She denies lightheadedness, dizziness, palpitations, chest pain and SOB.     O:    T(C): 36.2 (25 @ 21:05), Max: 36.7 (25 @ 10:06)  HR: 84 (25 @ 21:05) (84 - 90)  BP: 106/68 (25 @ 21:05) (100/72 - 110/62)  RR: 16 (25 @ 21:05) (16 - 16)  SpO2: 100% (25 @ 21:05) (100% - 100%)    Gen: NAD, AOx3  CV: RRR, S1/S2 present  Pulm: CTAB  Breast: Nontender, non-engorged   Abdomen:  Soft, non-tender, non-distended, +bowel sounds  Incision: Clean/dry/intact with dressing in place   Uterus:  Fundus firm below umbilicus  VE:  Expectant lochia  Ext:  Non-tender and non-edematous                           8.7    11.77 )-----------( 90       ( 2025 07:08 )             26.9             A/P:    -Vital signs stable  -Voiding, tolerating PO, bowel function nml   -Advance care as tolerated   -Continue routine postpartum and postoperative care and education  -Healthy male infant, received circumcision 2/2; Healthy female infant  -Dispo: Pt is stable, doing well and meeting all postpartum and postoperative milestones. Possible discharge to home today pending attending approval.

## 2025-02-03 NOTE — PROGRESS NOTE ADULT - ATTENDING COMMENTS
patient voiding and ambulating with no issues, passing gas, incision C/D/I with steristrips, moderate anemia noted, asymptomatic, to continue iron daily, stable for discharge with outpatient follow up in one week.

## 2025-02-04 ENCOUNTER — APPOINTMENT (OUTPATIENT)
Dept: ANTEPARTUM | Facility: CLINIC | Age: 30
End: 2025-02-04

## 2025-02-07 ENCOUNTER — APPOINTMENT (OUTPATIENT)
Dept: ANTEPARTUM | Facility: CLINIC | Age: 30
End: 2025-02-07

## 2025-02-08 DIAGNOSIS — Z28.09 IMMUNIZATION NOT CARRIED OUT BECAUSE OF OTHER CONTRAINDICATION: ICD-10-CM

## 2025-02-08 DIAGNOSIS — Z79.1 LONG TERM (CURRENT) USE OF NON-STEROIDAL ANTI-INFLAMMATORIES (NSAID): ICD-10-CM

## 2025-02-08 DIAGNOSIS — O36.5932 MATERNAL CARE FOR OTHER KNOWN OR SUSPECTED POOR FETAL GROWTH, THIRD TRIMESTER, FETUS 2: ICD-10-CM

## 2025-02-08 DIAGNOSIS — Z3A.37 37 WEEKS GESTATION OF PREGNANCY: ICD-10-CM

## 2025-02-11 ENCOUNTER — APPOINTMENT (OUTPATIENT)
Dept: ANTEPARTUM | Facility: CLINIC | Age: 30
End: 2025-02-11

## 2025-02-14 ENCOUNTER — APPOINTMENT (OUTPATIENT)
Dept: ANTEPARTUM | Facility: CLINIC | Age: 30
End: 2025-02-14

## 2025-02-14 LAB — SURGICAL PATHOLOGY STUDY: SIGNIFICANT CHANGE UP

## 2025-02-14 NOTE — OB RN DELIVERY SUMMARY - NS_CORDBLDTYPEB_OBGYN_ALL_OB
1 - Advair 115/21 mcg 2 puffs twice a day with mask/spacer  2- Albuterol 2 puffs with mask/spacer twice a day at the first sign of a cold and then every 4 hours per the asthma action plan  3- Kids zyrtec as needed for nasal congestion   4- Return in 6 months   No

## 2025-02-18 ENCOUNTER — APPOINTMENT (OUTPATIENT)
Dept: ANTEPARTUM | Facility: CLINIC | Age: 30
End: 2025-02-18

## 2025-05-20 NOTE — ED PROVIDER NOTE - FAMILY HISTORY
patient had to leave appt on 5/20/2025- provider was running behind - pt called, I tried talking to her to reschedule appt but she hung up before i could talk to her, I tried calling back no answer left vm on 5/20/2025, i sent pt edward msg apologizing and advising we will need to r/s appt before next botox appt on 7/9    No pertinent family history in first degree relatives

## 2025-06-17 ENCOUNTER — NON-APPOINTMENT (OUTPATIENT)
Age: 30
End: 2025-06-17